# Patient Record
Sex: MALE | Race: ASIAN | NOT HISPANIC OR LATINO
[De-identification: names, ages, dates, MRNs, and addresses within clinical notes are randomized per-mention and may not be internally consistent; named-entity substitution may affect disease eponyms.]

---
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
## 2025-04-01 ENCOUNTER — TELEPHONE (OUTPATIENT)
Dept: FAMILY MEDICINE | Facility: CLINIC | Age: 25
End: 2025-04-01

## 2025-04-01 NOTE — TELEPHONE ENCOUNTER
Reason for Call:  Appointment Request    Patient requesting this type of appt:  patient called and would like an appt at [facility]     Reason: est new care; circumsion consult and procedure scheduling.    Please contact patient.  Thank you.    Requested provider: Kathleen Awad    Reason patient unable to be scheduled: Needs to be scheduled by clinic    When does patient want to be seen/preferred time:  asap    Comments: na    Okay to leave a detailed message?: Yes at Cell number on file:    Telephone Information:   Mobile [phone]       Call taken on 4/1/2025 at 1:51 PM by Nicole Perez

## 2025-04-01 NOTE — TELEPHONE ENCOUNTER
Spoke with patient and scheduled PHY with Dr Chad Najera Monday 04/14/25 needs referral for circumcision.

## 2025-04-14 ENCOUNTER — OFFICE VISIT (OUTPATIENT)
Dept: FAMILY MEDICINE | Facility: CLINIC | Age: 25
End: 2025-04-14
Payer: COMMERCIAL

## 2025-04-14 VITALS
SYSTOLIC BLOOD PRESSURE: 142 MMHG | RESPIRATION RATE: 20 BRPM | OXYGEN SATURATION: 95 % | TEMPERATURE: 98.6 F | WEIGHT: 140.5 LBS | DIASTOLIC BLOOD PRESSURE: 89 MMHG | HEIGHT: 61 IN | BODY MASS INDEX: 26.53 KG/M2 | HEART RATE: 97 BPM

## 2025-04-14 DIAGNOSIS — Z41.2 ENCOUNTER FOR CIRCUMCISION: ICD-10-CM

## 2025-04-14 DIAGNOSIS — Z00.00 ROUTINE GENERAL MEDICAL EXAMINATION AT A HEALTH CARE FACILITY: Primary | ICD-10-CM

## 2025-04-14 PROCEDURE — 99385 PREV VISIT NEW AGE 18-39: CPT | Performed by: FAMILY MEDICINE

## 2025-04-14 SDOH — HEALTH STABILITY: PHYSICAL HEALTH: ON AVERAGE, HOW MANY DAYS PER WEEK DO YOU ENGAGE IN MODERATE TO STRENUOUS EXERCISE (LIKE A BRISK WALK)?: 3 DAYS

## 2025-04-14 SDOH — HEALTH STABILITY: PHYSICAL HEALTH: ON AVERAGE, HOW MANY MINUTES DO YOU ENGAGE IN EXERCISE AT THIS LEVEL?: 30 MIN

## 2025-04-14 ASSESSMENT — SOCIAL DETERMINANTS OF HEALTH (SDOH): HOW OFTEN DO YOU GET TOGETHER WITH FRIENDS OR RELATIVES?: ONCE A WEEK

## 2025-04-14 NOTE — PROGRESS NOTES
"Preventive Care Visit  [facility]  Chad Najera MD, Family Medicine  Apr 14, 2025      Assessment & Plan     Routine general medical examination at a health care facility  - Comprehensive metabolic panel (BMP + Alb, Alk Phos, ALT, AST, Total. Bili, TP); Future  - CBC with platelets; Future  - Lipid panel reflex to direct LDL Fasting; Future    Encounter for circumcision  - Adult Urology  Referral; Future    Patient has been advised of split billing requirements and indicates understanding: Yes        BMI  Estimated body mass index is 26.77 kg/m  as calculated from the following:    Height as of this encounter: 1.543 m (5' 0.75\").    Weight as of this encounter: 63.7 kg (140 lb 8 oz).   Weight management plan: Discussed healthy diet and exercise guidelines    Counseling  Appropriate preventive services were addressed with this patient via screening, questionnaire, or discussion as appropriate for fall prevention, nutrition, physical activity, Tobacco-use cessation, social engagement, weight loss and cognition.  Checklist reviewing preventive services available has been given to the patient.  Reviewed patient's diet, addressing concerns and/or questions.   He is at risk for lack of exercise and has been provided with information to increase physical activity for the benefit of his well-being.   The patient was instructed to see the dentist every 6 months.       The longitudinal plan of care for the diagnosis(es)/condition(s) as documented were addressed during this visit. Due to the added complexity in care, I will continue to support Modesto in the subsequent management and with ongoing continuity of care.    Subjective   Modesto is a 24 year old, presenting for the following:  Physical (Wanting circ referral)        4/14/2025     7:53 AM   Additional Questions   Roomed by JILLIAN Singh          HPI  Requesting a referral to Urology for Circumcision.          Advance Care " Planning  Patient does not have a Health Care Directive: Discussed advance care planning with patient; however, patient declined at this time.      4/14/2025   General Health   How would you rate your overall physical health? Excellent   Feel stress (tense, anxious, or unable to sleep) Not at all         4/14/2025   Nutrition   Three or more servings of calcium each day? Yes   Diet: Regular (no restrictions)   How many servings of fruit and vegetables per day? (!) 2-3   How many sweetened beverages each day? 0-1         4/14/2025   Exercise   Days per week of moderate/strenous exercise 3 days   Average minutes spent exercising at this level 30 min         4/14/2025   Social Factors   Frequency of gathering with friends or relatives Once a week   Worry food won't last until get money to buy more No   Food not last or not have enough money for food? No   Do you have housing? (Housing is defined as stable permanent housing and does not include staying ouside in a car, in a tent, in an abandoned building, in an overnight shelter, or couch-surfing.) Patient declined   Are you worried about losing your housing? No   Lack of transportation? Patient declined   Unable to get utilities (heat,electricity)? No         4/14/2025   Dental   Dentist two times every year? (!) NO           Today's PHQ-2 Score:       4/14/2025     7:56 AM   PHQ-2 ( 1999 [employer])   Q1: Little interest or pleasure in doing things 0   Q2: Feeling down, depressed or hopeless 0   PHQ-2 Score 0           4/14/2025   Substance Use   Alcohol more than 3/day or more than 7/wk No   Do you use any other substances recreationally? No     Social History     Tobacco Use    Smoking status: Never    Smokeless tobacco: Never             4/14/2025   One time HIV Screening   Previous HIV test? No         4/14/2025   STI Screening   New sexual partner(s) since last STI/HIV test? (!) DECLINE         4/14/2025   Contraception/Family Planning   Questions about contraception  "or family planning No        Reviewed and updated as needed this visit by Provider                         Objective    Exam  BP (!) 142/89   Pulse 97   Temp 98.6  F (37  C) (Oral)   Resp 20   Ht 1.543 m (5' 0.75\")   Wt 63.7 kg (140 lb 8 oz)   SpO2 95%   BMI 26.77 kg/m     Estimated body mass index is 26.77 kg/m  as calculated from the following:    Height as of this encounter: 1.543 m (5' 0.75\").    Weight as of this encounter: 63.7 kg (140 lb 8 oz).    Physical Exam  GENERAL: alert and no distress  ENT: normal   NECK: no adenopathy, no asymmetry, masses, or scars  RESP: lungs clear to auscultation - no rales, rhonchi or wheezes  CV: regular rate and rhythm, normal S1 S2, no S3 or S4, no murmur, click or rub, no peripheral edema  ABDOMEN: soft, nontender, no hepatosplenomegaly, no masses and bowel sounds normal  MS: no gross musculoskeletal defects noted, no edema  SKIN: no suspicious lesions or rashes  NEURO: Normal strength and tone, mentation intact and speech normal  PSYCH: mentation appears normal, affect normal/bright        Signed Electronically by: Chad Najera MD    "

## 2025-04-14 NOTE — PATIENT INSTRUCTIONS
Patient Education   Preventive Care Advice   This is general advice given by our system to help you stay healthy. However, your care team may have specific advice just for you. Please talk to your care team about your preventive care needs.  Nutrition  Eat 5 or more servings of fruits and vegetables each day.  Try wheat bread, brown rice and whole grain pasta (instead of white bread, rice, and pasta).  Get enough calcium and vitamin D. Check the label on foods and aim for 100% of the RDA (recommended daily allowance).  Lifestyle  Exercise at least 150 minutes each week  (30 minutes a day, 5 days a week).  Do muscle strengthening activities 2 days a week. These help control your weight and prevent disease.  No smoking.  Wear sunscreen to prevent skin cancer.  Have a dental exam and cleaning every 6 months.  Yearly exams  See your health care team every year to talk about:  Any changes in your health.  Any medicines your care team has prescribed.  Preventive care, family planning, and ways to prevent chronic diseases.  Shots (vaccines)   HPV shots (up to age 26), if you've never had them before.  Hepatitis B shots (up to age 59), if you've never had them before.  COVID-19 shot: Get this shot when it's due.  Flu shot: Get a flu shot every year.  Tetanus shot: Get a tetanus shot every 10 years.  Pneumococcal, hepatitis A, and RSV shots: Ask your care team if you need these based on your risk.  Shingles shot (for age 50 and up)  General health tests  Diabetes screening:  Starting at age 35, Get screened for diabetes at least every 3 years.  If you are younger than age 35, ask your care team if you should be screened for diabetes.  Cholesterol test: At age 39, start having a cholesterol test every 5 years, or more often if advised.  Bone density scan (DEXA): At age 50, ask your care team if you should have this scan for osteoporosis (brittle bones).  Hepatitis C: Get tested at least once in your life.  STIs (sexually  transmitted infections)  Before age 24: Ask your care team if you should be screened for STIs.  After age 24: Get screened for STIs if you're at risk. You are at risk for STIs (including HIV) if:  You are sexually active with more than one person.  You don't use condoms every time.  You or a partner was diagnosed with a sexually transmitted infection.  If you are at risk for HIV, ask about PrEP medicine to prevent HIV.  Get tested for HIV at least once in your life, whether you are at risk for HIV or not.  Cancer screening tests  Cervical cancer screening: If you have a cervix, begin getting regular cervical cancer screening tests starting at age 21.  Breast cancer scan (mammogram): If you've ever had breasts, begin having regular mammograms starting at age 40. This is a scan to check for breast cancer.  Colon cancer screening: It is important to start screening for colon cancer at age 45.  Have a colonoscopy test every 10 years (or more often if you're at risk) Or, ask your provider about stool tests like a FIT test every year or Cologuard test every 3 years.  To learn more about your testing options, visit:   .  For help making a decision, visit:   https://bit.ly/ba81769.  Prostate cancer screening test: If you have a prostate, ask your care team if a prostate cancer screening test (PSA) at age 55 is right for you.  Lung cancer screening: If you are a current or former smoker ages 50 to 80, ask your care team if ongoing lung cancer screenings are right for you.  For informational purposes only. Not to replace the advice of your health care provider. Copyright   2023 [employer]. All rights reserved. Clinically reviewed by the [facility] Program. [employer] [id number] - REV 01/24.

## 2025-06-01 NOTE — PROGRESS NOTES
"UROLOGY CLINIC NEW VISIT    Chief Complaint:  Phimosis      Referring Provider:  Chad Najera    Subjective:     Modesto Rodriguez is a 25 year old male  with a PMHx significant for those items listed, who is presenting to clinic today to discuss phimosis.    Patient is unable to fully retract foreskin     Ramirez has no history of skin and local infections.   Has not had to take antibiotics/antifungals in the past     He is able to urinate, does not have ballooning. No hematuria     Otherwise healthy no history of DM, wound issues, blood thinners PCP notes reviewed.    Currently the patient has no fever, chills, nausea, vomiting or other signs/symptoms suggestive of acute systemic infection.    PMH  No past medical history on file.  PSH  No past surgical history on file.  FAMHx  No family history on file.  ALLERGY  No Known Allergies  MEDICATIONS  currently has no medications in their medication list.  ROS:  Constitutional: negative  Eyes: negative  Ears/Nose/Throat/Mouth: negative  Respiratory: negative  Cardiovascular: negative  Gastrointestinal: negative  Genito-Urinary: as documented above in HPI  Musculoskeletal: negative  Neurological: negative  Integumentary: negative      Objective:     BP (!) 140/100 (BP Location: Right arm, Patient Position: Sitting, Cuff Size: Adult Regular)   Pulse 80   Temp 96.8  F (36  C) (Temporal)    Physical Exam:  GENERAL: Patient is AOx3, in no acute distress  CARDIAC: regular rate  LUNG: breathing non labored  ABD: soft, nondistended  : normal uncircumcised phallus, testes descended bilaterally  No palpable peyronie's plaques  Tight phimotic ring unable to fully retract foreskin, minimal penoscrotal webbing      LABORATORY:  No results found for: \"CREATININE\"  No results found for: \"PSA\"      Assessment:       Modesto Rodriguez is a 25 year old male  with a PMHx significant for those items listed, who is presenting to clinic today to discuss phimosis.     Plan:      Encounter for " circumcision  Phimosis    We discussed that bothersome phimosis can result from, as well as predispose to, chronic irritation or inflammation of the prepuce. It can worsen or remain stable with time but rarely improves without intervention. Conservative treatment would include betamethasone valerate ointment 0.1% applied to the phimotic ring twice daily for at least 1 month and preferably two. The patient must pull back the redundant foreskin and apply a small amount of the ointment to the tightest portion of the ring for maximum possible effect.    If steroid ointment has not had the desired effect of treating the phimotic ring sufficiently to allow for retraction of the foreskin, then surgical treatment is reasonable. Surgery would include skin-sparing Heineke-Mikeliasz preputioplasty, versus a full circumcision with excision of redundant prepuce.    Discussed that risks of surgery on the prepuce and phallus include bleeding, infection, injury to urethra or nerves, poor cosmesis, chronic pain, formation of cicatrix (tethering scar) requiring intervention.     The patient verbalized understanding of the risks of surgery and wishes to proceed with circumcision, dorsal slit, possible scrotoplasty.

## 2025-06-03 ENCOUNTER — OFFICE VISIT (OUTPATIENT)
Dept: UROLOGY | Facility: CLINIC | Age: 25
End: 2025-06-03
Attending: FAMILY MEDICINE
Payer: COMMERCIAL

## 2025-06-03 VITALS — TEMPERATURE: 96.8 F | DIASTOLIC BLOOD PRESSURE: 100 MMHG | HEART RATE: 80 BPM | SYSTOLIC BLOOD PRESSURE: 140 MMHG

## 2025-06-03 DIAGNOSIS — Z41.2 ENCOUNTER FOR CIRCUMCISION: ICD-10-CM

## 2025-06-03 DIAGNOSIS — N47.1 PHIMOSIS: Primary | ICD-10-CM

## 2025-06-03 PROCEDURE — 99203 OFFICE O/P NEW LOW 30 MIN: CPT | Performed by: STUDENT IN AN ORGANIZED HEALTH CARE EDUCATION/TRAINING PROGRAM

## 2025-06-03 ASSESSMENT — PAIN SCALES - GENERAL: PAINLEVEL_OUTOF10: NO PAIN (0)

## 2025-06-04 ENCOUNTER — TELEPHONE (OUTPATIENT)
Dept: UROLOGY | Facility: CLINIC | Age: 25
End: 2025-06-04
Payer: COMMERCIAL

## 2025-06-04 ENCOUNTER — ANESTHESIA EVENT (OUTPATIENT)
Dept: SURGERY | Facility: AMBULATORY SURGERY CENTER | Age: 25
End: 2025-06-04
Payer: COMMERCIAL

## 2025-06-04 PROBLEM — Z41.2 ENCOUNTER FOR CIRCUMCISION: Status: ACTIVE | Noted: 2025-06-03

## 2025-06-04 RX ORDER — CEFAZOLIN SODIUM 2 G/100ML
2 INJECTION, SOLUTION INTRAVENOUS
Status: COMPLETED | OUTPATIENT
Start: 2025-06-04 | End: 2025-06-05

## 2025-06-04 NOTE — H&P
"Urology Preoperative H&P    Patient information was obtained from patient and medical chart review.  History/Exam limitations: none.    Modesto Rodriguez is a 25 year old male, here today to undergo Circumcision dorsal slit.    Denies significant changes to hx since last visit.    Patient Active Problem List    Diagnosis Date Noted    Encounter for circumcision 06/03/2025     Priority: Medium    Hearing Loss      Priority: Medium     Created by Conversion  Replacement Utility updated for latest IMO load        Limb Pain      Priority: Medium     Created by Conversion        Finger Sprain      Priority: Medium     Created by Conversion  [facility] Annotation: Nov 7 2013 12:29PM - Zechariah Franklin: small finger        Epistaxis      Priority: Medium     Created by Conversion         History reviewed. No pertinent past medical history.   History reviewed. No pertinent surgical history.  No Known Allergies   Social History     Tobacco Use    Smoking status: Never    Smokeless tobacco: Never   Substance Use Topics    Alcohol use: Not Currently      History reviewed. No pertinent family history.     Review of Systems  Consitutional: denies fevers, chills  Allergy: as indicated on chart   Respiratory: denies wheezing or shortness of breath   Cardiovascular: denies chest pain or palpitations  : denies changes in urinary symptoms since previous visit    Objective:     /88 (Cuff Size: Adult Regular)   Temp 98  F (36.7  C) (Temporal)   Resp 16   Ht 1.549 m (5' 1\")   Wt 63.5 kg (140 lb)   SpO2 95%   BMI 26.45 kg/m      General Appearance: NAD  Head/Neck: Normocephalic, atraumatic  Eyes: Anicteric, sclera white     Lungs/Chest: Respirations unlabored  Heart: Extremities warm and well-perfused  Abdomen: Soft, non-distended.   Extremities: Extremities without evidence of trauma  Skin:  No jaundice  Neurologic: GCS 15    Data Review: Labs    Reviewed    Images    Reviewed    Assessment:     Pre-Op Diagnosis Codes:      * " Encounter for circumcision [Z41.2]    Plan:     Urine culture not indicated     We discussed that bothersome phimosis can result from, as well as predispose to, chronic irritation or inflammation of the prepuce. It can worsen or remain stable with time but rarely improves without intervention. Conservative treatment would include betamethasone valerate ointment 0.1% applied to the phimotic ring twice daily for at least 1 month and preferably two. The patient must pull back the redundant foreskin and apply a small amount of the ointment to the tightest portion of the ring for maximum possible effect.    If steroid ointment has not had the desired effect of treating the phimotic ring sufficiently to allow for retraction of the foreskin, then surgical treatment is reasonable. Surgery would include skin-sparing Heineke-Mikulicz preputioplasty, versus a full circumcision with excision of redundant prepuce.    Discussed that risks of surgery on the prepuce and phallus include bleeding, infection, injury to urethra or nerves, poor cosmesis, chronic pain, formation of cicatrix (tethering scar) requiring intervention.     Procedure(s):  Circumcision possible dorsal keo Bach MD

## 2025-06-05 ENCOUNTER — ANESTHESIA (OUTPATIENT)
Dept: SURGERY | Facility: AMBULATORY SURGERY CENTER | Age: 25
End: 2025-06-05
Payer: COMMERCIAL

## 2025-06-05 ENCOUNTER — HOSPITAL ENCOUNTER (OUTPATIENT)
Facility: AMBULATORY SURGERY CENTER | Age: 25
End: 2025-06-05
Attending: STUDENT IN AN ORGANIZED HEALTH CARE EDUCATION/TRAINING PROGRAM
Payer: COMMERCIAL

## 2025-06-05 VITALS
HEIGHT: 61 IN | OXYGEN SATURATION: 94 % | DIASTOLIC BLOOD PRESSURE: 59 MMHG | WEIGHT: 140 LBS | HEART RATE: 112 BPM | BODY MASS INDEX: 26.43 KG/M2 | SYSTOLIC BLOOD PRESSURE: 112 MMHG | TEMPERATURE: 96.8 F | RESPIRATION RATE: 16 BRPM

## 2025-06-05 DIAGNOSIS — Z41.2 ENCOUNTER FOR CIRCUMCISION: Primary | ICD-10-CM

## 2025-06-05 RX ORDER — SODIUM CHLORIDE, SODIUM LACTATE, POTASSIUM CHLORIDE, CALCIUM CHLORIDE 600; 310; 30; 20 MG/100ML; MG/100ML; MG/100ML; MG/100ML
INJECTION, SOLUTION INTRAVENOUS CONTINUOUS
OUTPATIENT
Start: 2025-06-05

## 2025-06-05 RX ORDER — OXYCODONE HYDROCHLORIDE 10 MG/1
10 TABLET ORAL
OUTPATIENT
Start: 2025-06-05

## 2025-06-05 RX ORDER — DEXAMETHASONE SODIUM PHOSPHATE 10 MG/ML
INJECTION, SOLUTION INTRAMUSCULAR; INTRAVENOUS PRN
Status: DISCONTINUED | OUTPATIENT
Start: 2025-06-05 | End: 2025-06-05

## 2025-06-05 RX ORDER — NALOXONE HYDROCHLORIDE 0.4 MG/ML
0.1 INJECTION, SOLUTION INTRAMUSCULAR; INTRAVENOUS; SUBCUTANEOUS
OUTPATIENT
Start: 2025-06-05

## 2025-06-05 RX ORDER — ACETAMINOPHEN 325 MG/1
975 TABLET ORAL ONCE
Status: COMPLETED | OUTPATIENT
Start: 2025-06-05 | End: 2025-06-05

## 2025-06-05 RX ORDER — DEXAMETHASONE SODIUM PHOSPHATE 4 MG/ML
4 INJECTION, SOLUTION INTRA-ARTICULAR; INTRALESIONAL; INTRAMUSCULAR; INTRAVENOUS; SOFT TISSUE
OUTPATIENT
Start: 2025-06-05

## 2025-06-05 RX ORDER — ACETAMINOPHEN 650 MG
TABLET, EXTENDED RELEASE ORAL PRN
Status: DISCONTINUED | OUTPATIENT
Start: 2025-06-05 | End: 2025-06-05 | Stop reason: HOSPADM

## 2025-06-05 RX ORDER — ONDANSETRON 4 MG/1
4 TABLET, ORALLY DISINTEGRATING ORAL EVERY 30 MIN PRN
OUTPATIENT
Start: 2025-06-05

## 2025-06-05 RX ORDER — PROPOFOL 10 MG/ML
INJECTION, EMULSION INTRAVENOUS CONTINUOUS PRN
Status: DISCONTINUED | OUTPATIENT
Start: 2025-06-05 | End: 2025-06-05

## 2025-06-05 RX ORDER — SENNOSIDES 8.6 MG
650 CAPSULE ORAL EVERY 6 HOURS PRN
Qty: 30 TABLET | Refills: 0 | Status: SHIPPED | OUTPATIENT
Start: 2025-06-05

## 2025-06-05 RX ORDER — OXYCODONE HYDROCHLORIDE 5 MG/1
5 TABLET ORAL
OUTPATIENT
Start: 2025-06-05

## 2025-06-05 RX ORDER — PROPOFOL 10 MG/ML
INJECTION, EMULSION INTRAVENOUS PRN
Status: DISCONTINUED | OUTPATIENT
Start: 2025-06-05 | End: 2025-06-05

## 2025-06-05 RX ORDER — KETOROLAC TROMETHAMINE 10 MG/1
10 TABLET, FILM COATED ORAL EVERY 6 HOURS PRN
Qty: 28 TABLET | Refills: 0 | Status: SHIPPED | OUTPATIENT
Start: 2025-06-05 | End: 2025-06-12

## 2025-06-05 RX ORDER — ONDANSETRON 2 MG/ML
4 INJECTION INTRAMUSCULAR; INTRAVENOUS EVERY 30 MIN PRN
OUTPATIENT
Start: 2025-06-05

## 2025-06-05 RX ORDER — DIAZEPAM 10 MG/1
10 TABLET ORAL
Qty: 21 TABLET | Refills: 0 | Status: SHIPPED | OUTPATIENT
Start: 2025-06-05 | End: 2025-06-26

## 2025-06-05 RX ORDER — METHOCARBAMOL 750 MG/1
750 TABLET, FILM COATED ORAL 4 TIMES DAILY PRN
Qty: 56 TABLET | Refills: 0 | Status: SHIPPED | OUTPATIENT
Start: 2025-06-05 | End: 2025-06-19

## 2025-06-05 RX ORDER — FENTANYL CITRATE 0.05 MG/ML
50 INJECTION, SOLUTION INTRAMUSCULAR; INTRAVENOUS EVERY 5 MIN PRN
OUTPATIENT
Start: 2025-06-05

## 2025-06-05 RX ORDER — FENTANYL CITRATE 50 UG/ML
INJECTION, SOLUTION INTRAMUSCULAR; INTRAVENOUS PRN
Status: DISCONTINUED | OUTPATIENT
Start: 2025-06-05 | End: 2025-06-05

## 2025-06-05 RX ORDER — LIDOCAINE 40 MG/G
CREAM TOPICAL
OUTPATIENT
Start: 2025-06-05

## 2025-06-05 RX ORDER — BUPIVACAINE HYDROCHLORIDE 2.5 MG/ML
INJECTION, SOLUTION INFILTRATION; PERINEURAL PRN
Status: DISCONTINUED | OUTPATIENT
Start: 2025-06-05 | End: 2025-06-05 | Stop reason: HOSPADM

## 2025-06-05 RX ORDER — FENTANYL CITRATE 0.05 MG/ML
25 INJECTION, SOLUTION INTRAMUSCULAR; INTRAVENOUS EVERY 5 MIN PRN
OUTPATIENT
Start: 2025-06-05

## 2025-06-05 RX ORDER — ONDANSETRON 2 MG/ML
INJECTION INTRAMUSCULAR; INTRAVENOUS PRN
Status: DISCONTINUED | OUTPATIENT
Start: 2025-06-05 | End: 2025-06-05

## 2025-06-05 RX ORDER — GLYCOPYRROLATE 0.2 MG/ML
INJECTION, SOLUTION INTRAMUSCULAR; INTRAVENOUS PRN
Status: DISCONTINUED | OUTPATIENT
Start: 2025-06-05 | End: 2025-06-05

## 2025-06-05 RX ORDER — SODIUM CHLORIDE, SODIUM LACTATE, POTASSIUM CHLORIDE, CALCIUM CHLORIDE 600; 310; 30; 20 MG/100ML; MG/100ML; MG/100ML; MG/100ML
INJECTION, SOLUTION INTRAVENOUS CONTINUOUS PRN
Status: DISCONTINUED | OUTPATIENT
Start: 2025-06-05 | End: 2025-06-05

## 2025-06-05 RX ORDER — HYDROMORPHONE HCL IN WATER/PF 6 MG/30 ML
0.4 PATIENT CONTROLLED ANALGESIA SYRINGE INTRAVENOUS EVERY 5 MIN PRN
OUTPATIENT
Start: 2025-06-05

## 2025-06-05 RX ORDER — LIDOCAINE HYDROCHLORIDE 20 MG/ML
INJECTION, SOLUTION INFILTRATION; PERINEURAL PRN
Status: DISCONTINUED | OUTPATIENT
Start: 2025-06-05 | End: 2025-06-05

## 2025-06-05 RX ORDER — GINSENG 100 MG
CAPSULE ORAL PRN
Status: DISCONTINUED | OUTPATIENT
Start: 2025-06-05 | End: 2025-06-05 | Stop reason: HOSPADM

## 2025-06-05 RX ORDER — HYDROMORPHONE HCL IN WATER/PF 6 MG/30 ML
0.2 PATIENT CONTROLLED ANALGESIA SYRINGE INTRAVENOUS EVERY 5 MIN PRN
OUTPATIENT
Start: 2025-06-05

## 2025-06-05 RX ADMIN — FENTANYL CITRATE 100 MCG: 50 INJECTION, SOLUTION INTRAMUSCULAR; INTRAVENOUS at 10:42

## 2025-06-05 RX ADMIN — ACETAMINOPHEN 975 MG: 325 TABLET ORAL at 09:38

## 2025-06-05 RX ADMIN — LIDOCAINE HYDROCHLORIDE 3 ML: 20 INJECTION, SOLUTION INFILTRATION; PERINEURAL at 10:42

## 2025-06-05 RX ADMIN — PROPOFOL 180 MCG/KG/MIN: 10 INJECTION, EMULSION INTRAVENOUS at 10:43

## 2025-06-05 RX ADMIN — SODIUM CHLORIDE, SODIUM LACTATE, POTASSIUM CHLORIDE, CALCIUM CHLORIDE: 600; 310; 30; 20 INJECTION, SOLUTION INTRAVENOUS at 09:38

## 2025-06-05 RX ADMIN — GLYCOPYRROLATE 0.2 MG: 0.2 INJECTION, SOLUTION INTRAMUSCULAR; INTRAVENOUS at 10:46

## 2025-06-05 RX ADMIN — SODIUM CHLORIDE, SODIUM LACTATE, POTASSIUM CHLORIDE, CALCIUM CHLORIDE: 600; 310; 30; 20 INJECTION, SOLUTION INTRAVENOUS at 10:35

## 2025-06-05 RX ADMIN — PROPOFOL 200 MG: 10 INJECTION, EMULSION INTRAVENOUS at 10:42

## 2025-06-05 RX ADMIN — ONDANSETRON 4 MG: 2 INJECTION INTRAMUSCULAR; INTRAVENOUS at 11:22

## 2025-06-05 RX ADMIN — CEFAZOLIN SODIUM 2 G: 2 INJECTION, SOLUTION INTRAVENOUS at 10:35

## 2025-06-05 RX ADMIN — DEXAMETHASONE SODIUM PHOSPHATE 10 MG: 10 INJECTION, SOLUTION INTRAMUSCULAR; INTRAVENOUS at 10:46

## 2025-06-05 NOTE — ANESTHESIA POSTPROCEDURE EVALUATION
Patient: Modesto Rodriguez    Procedure: Procedure(s):  Circumcision with dorsal slit       Anesthesia Type:  General    Note:  Disposition: Outpatient   Postop Pain Control: Uneventful            Sign Out: Well controlled pain   PONV: No   Neuro/Psych: Uneventful            Sign Out: Acceptable/Baseline neuro status   Airway/Respiratory: Uneventful            Sign Out: Acceptable/Baseline resp. status   CV/Hemodynamics: Uneventful            Sign Out: Acceptable CV status; No obvious hypovolemia; No obvious fluid overload   Other NRE: NONE   DID A NON-ROUTINE EVENT OCCUR? No       Last vitals:  Vitals Value Taken Time   /61 06/05/25 12:31   Temp 96.9  F (36.1  C) 06/05/25 11:52   Pulse 107 06/05/25 12:39   Resp 16 06/05/25 12:31   SpO2 95 % 06/05/25 12:39   Vitals shown include unfiled device data.    Electronically Signed By: Jose Mcfarlane MD  June 5, 2025  12:44 PM

## 2025-06-05 NOTE — OP NOTE
Circumcision    PREOPERATIVE DIAGNOSIS : Phimosis     POSTOPERATIVE DIAGNOSIS: Same    Procedure:   Circumcision   Dorsal Slit      ATTENDING SURGEON(S) : Sergio Bach MD     RESIDENT SURGEON(S):  None     ANESTHESIA: General     PREPARATION: Chloraprep    EBL: Nonez    COMPLICATIONS: None    Indications: Phimosis pain and unable to retract foreskin.     PROCEDURE:  With the patient supine on the surgical table, following induction of adequate general anesthesia, genitalia were prepped and draped in usual sterile fashion.  A surgical timeout was completed.    The patient had significant pinpoint phimosis of the distal foreskin.  A dorsal slit was performed, a hemostat was used to compress the dorsal foreskin tissue and crush blood supply, the foreskin was then opened with a Metzenbaum scissors. A 7 x 7 mm preputial skirt was designed. The circumferential incision was completed and then the proximal  incision was completed circumferentially.    Hemostasis was achieved with electrocautery. The penile skin was now worked around ventrally so as to mould to the shaft.  A 3 -0 Monocryl suture was placed in simple fashion at the 12, 6,  3 and 9 o'clock positions approximating the  shaft skin to the preputial skirt at these locations including dartos with any remaining gaps filled in with 3-0 Monocryl sutures.     A Bacitracin was placed and then a  circumferential compression wrap of Evelin.  A dorsal penile and penile ring block using 0.25% marcaine palin was now perfomed at the base of the penis.    Plan  - Follow-up with Dr. Bach in 6 weeks

## 2025-06-05 NOTE — ANESTHESIA PREPROCEDURE EVALUATION
"Anesthesia Pre-Procedure Evaluation    Patient: Modesto Rodriguez   MRN: [id number] : 2000          Procedure : Procedure(s):  Circumcision possible dorsal slit         History reviewed. No pertinent past medical history.   History reviewed. No pertinent surgical history.   No Known Allergies   Social History     Tobacco Use    Smoking status: Never    Smokeless tobacco: Never   Substance Use Topics    Alcohol use: Not Currently      Wt Readings from Last 1 Encounters:   25 63.5 kg (140 lb)        Anesthesia Evaluation   Pt has had prior anesthetic.     No history of anesthetic complications       ROS/MED HX  ENT/Pulmonary:  - neg pulmonary ROS     Neurologic:  - neg neurologic ROS     Cardiovascular:  - neg cardiovascular ROS     METS/Exercise Tolerance: >4 METS    Hematologic:  - neg hematologic  ROS     Musculoskeletal:  - neg musculoskeletal ROS     GI/Hepatic:  - neg GI/hepatic ROS     Renal/Genitourinary:  - neg Renal ROS     Endo:  - neg endo ROS     Psychiatric/Substance Use:       Infectious Disease:       Malignancy:       Other:              Physical Exam  Airway  Mallampati: I  TM distance: >3 FB  Neck ROM: full    Cardiovascular - normal exam  Rhythm: regular  Rate: normal rate     Dental Comments: good      Pulmonary - normal examBreath sounds clear to auscultation        Neurological - normal exam  He appears awake, alert and oriented x3.    Other Findings       OUTSIDE LABS:  CBC: No results found for: \"WBC\", \"HGB\", \"HCT\", \"PLT\"  BMP: No results found for: \"NA\", \"POTASSIUM\", \"CHLORIDE\", \"CO2\", \"BUN\", \"CR\", \"GLC\"  COAGS: No results found for: \"PTT\", \"INR\", \"FIBR\"  POC: No results found for: \"BGM\", \"HCG\", \"HCGS\"  HEPATIC: No results found for: \"ALBUMIN\", \"PROTTOTAL\", \"ALT\", \"AST\", \"GGT\", \"ALKPHOS\", \"BILITOTAL\", \"BILIDIRECT\", \"JEM\"  OTHER: No results found for: \"PH\", \"LACT\", \"A1C\", \"CAITLIN\", \"PHOS\", \"MAG\", \"LIPASE\", \"AMYLASE\", \"TSH\", \"T4\", \"T3\", \"CRP\", \"SED\"    Anesthesia Plan    ASA Status:  " "1      NPO Status: NPO Appropriate   Anesthesia Type: General.  Airway: supraglottic airway.  Induction: intravenous.  Maintenance: TIVA.   Techniques and Equipment:     - Airway:  Planned airway equipment includes supraglottic airway.     - Monitoring Plan: standard ASA monitoring     Consents    Anesthesia Plan(s) and associated risks, benefits, and realistic alternatives discussed. Questions answered and patient/representative(s) expressed understanding.     - Discussed:     - Discussed with:  Patient        - Pt is DNR/DNI Status: no DNR          Postoperative Care         Comments:    Other Comments: Acetaminophen  Local by surgeon                   Jose Mcfarlane MD    I have reviewed the pertinent notes and labs in the chart from the past 30 days and (re)examined the patient.  Any updates or changes from those notes are reflected in this note.    Clinically Significant Risk Factors Present on Admission                             # Overweight: Estimated body mass index is 26.45 kg/m  as calculated from the following:    Height as of this encounter: 1.549 m (5' 1\").    Weight as of this encounter: 63.5 kg (140 lb).                    "

## 2025-06-05 NOTE — DISCHARGE INSTRUCTIONS
CIRCUMCISION  PATIENT  INSTRUCTIONS    You have just had a circumcision. There is a dressing around your penis. This dressing may fall off by itself, and that is ok. If it has not fallen off by (48 hours), then you can gently remove the dressing yourself.   The stitches will dissolve by themselves over time.    After you have removed the dressing, you may let warm soapy water from the shower run over the penis. Afterwards, pat dry gently, then apply bacitracin ointment to the area before putting on your underwear. Do not rub the area when you dry it off. You should apply bacitracin (antibiotic ointment) twice a day (morning and night).     It is normal to experience some minor oozing of blood around the incision line, penile swelling and mild  Pain.  This is normal    You may use ice packs for the swelling and discomfort. If you use an ice pack, apply the ice pack for 20 minutes at a time, with at least a 30 minute break in between.     For mild or moderate pain, take regular, over the counter Ibuprofen (Motrin) or Tylenol. Do not take more than 4 g of tylenol in a day. Do not take motrin if you have kidney disease or a history of peptic ulcer disease/GI bleed.     Restrictions:   - No heavy lifting over 15 pounds for the next 4 weeks   - No sexual activity for 4 weeks (it is ok to have an erection but do not use it)     If you have any problems relating to your procedure call your doctor's office nurse line at telephone  [facility] [place]  [facility], [address], [place] [address]  Phone: [phone]  Fax: [phone]    - Follow up in 6 weeks with Dr. Bach for post operative check, or sooner if issues arise      If you have any questions or concerns regarding your procedure please contact Dr. Bach, his office number is [phone].     You have received 975 mg of Acetaminophen (Tylenol) at 9:40 AM. Please do not take an additional dose of Tylenol until after  3:40 PM.     Do not exceed 4,000 mg of acetaminophen during a 24 hour period and keep in mind that acetaminophen can also be found in many over-the-counter cold medications as well as narcotics that may be given for pain.       [facility]   Adult Discharge Orders & Instructions     For 24 hours after surgery    Get plenty of rest.  A responsible adult must stay with you for at least 24 hours after you leave the hospital.   Do not drive or use heavy equipment.  If you have weakness or tingling, don't drive or use heavy equipment until this feeling goes away.  Do not drink alcohol.  Avoid strenuous or risky activities.  Ask for help when climbing stairs.   You may feel lightheaded.  IF so, sit for a few minutes before standing.  Have someone help you get up.   If you have nausea (feel sick to your stomach): Drink only clear liquids such as apple juice, ginger ale, broth or 7-Up.  Rest may also help.  Be sure to drink enough fluids.  Move to a regular diet as you feel able.  You may have a slight fever. Call the doctor if your fever is over 100 F (37.7 C) (taken under the tongue) or lasts longer than 24 hours.  You may have a dry mouth, a sore throat, muscle aches or trouble sleeping.  These should go away after 24 hours.  Do not make important or legal decisions.     Call your doctor for any of the followin.  Signs of infection (fever, growing tenderness at the surgery site, a large amount of drainage or bleeding, severe pain, foul-smelling drainage, redness, swelling).    2. It has been over 8 to 10 hours since surgery and you are still not able to urinate (pass water).    3.  Headache for over 24 hours.

## 2025-06-08 ENCOUNTER — ANESTHESIA (OUTPATIENT)
Dept: SURGERY | Facility: HOSPITAL | Age: 25
End: 2025-06-08
Payer: COMMERCIAL

## 2025-06-08 ENCOUNTER — ANESTHESIA EVENT (OUTPATIENT)
Dept: SURGERY | Facility: HOSPITAL | Age: 25
End: 2025-06-08
Payer: COMMERCIAL

## 2025-06-08 ENCOUNTER — HOSPITAL ENCOUNTER (EMERGENCY)
Facility: HOSPITAL | Age: 25
End: 2025-06-08
Attending: EMERGENCY MEDICINE
Payer: COMMERCIAL

## 2025-06-08 VITALS
OXYGEN SATURATION: 96 % | HEART RATE: 92 BPM | DIASTOLIC BLOOD PRESSURE: 82 MMHG | HEIGHT: 62 IN | SYSTOLIC BLOOD PRESSURE: 136 MMHG | WEIGHT: 140 LBS | RESPIRATION RATE: 16 BRPM | BODY MASS INDEX: 25.76 KG/M2 | TEMPERATURE: 98.2 F

## 2025-06-08 DIAGNOSIS — Z98.890 STATUS POST ROUTINE CIRCUMCISION: ICD-10-CM

## 2025-06-08 DIAGNOSIS — N99.820 POSTOPERATIVE HEMORRHAGE INVOLVING GENITOURINARY SYSTEM FOLLOWING GENITOURINARY PROCEDURE: Primary | ICD-10-CM

## 2025-06-08 LAB
ABO + RH BLD: NORMAL
ANION GAP SERPL CALCULATED.3IONS-SCNC: 13 MMOL/L (ref 7–15)
BASOPHILS # BLD AUTO: 0.1 10E3/UL (ref 0–0.2)
BASOPHILS NFR BLD AUTO: 1 %
BLD GP AB SCN SERPL QL: NEGATIVE
BUN SERPL-MCNC: 11 MG/DL (ref 6–20)
CALCIUM SERPL-MCNC: 9.1 MG/DL (ref 8.8–10.4)
CHLORIDE SERPL-SCNC: 105 MMOL/L (ref 98–107)
CREAT SERPL-MCNC: 1.13 MG/DL (ref 0.67–1.17)
EGFRCR SERPLBLD CKD-EPI 2021: >90 ML/MIN/1.73M2
EOSINOPHIL # BLD AUTO: 0.3 10E3/UL (ref 0–0.7)
EOSINOPHIL NFR BLD AUTO: 3 %
ERYTHROCYTE [DISTWIDTH] IN BLOOD BY AUTOMATED COUNT: 11.9 % (ref 10–15)
GLUCOSE SERPL-MCNC: 112 MG/DL (ref 70–99)
HCO3 SERPL-SCNC: 22 MMOL/L (ref 22–29)
HCT VFR BLD AUTO: 47.2 % (ref 40–53)
HGB BLD-MCNC: 16.2 G/DL (ref 13.3–17.7)
HOLD SPECIMEN: NORMAL
IMM GRANULOCYTES # BLD: 0 10E3/UL
IMM GRANULOCYTES NFR BLD: 0 %
LYMPHOCYTES # BLD AUTO: 2.3 10E3/UL (ref 0.8–5.3)
LYMPHOCYTES NFR BLD AUTO: 28 %
MCH RBC QN AUTO: 29.4 PG (ref 26.5–33)
MCHC RBC AUTO-ENTMCNC: 34.3 G/DL (ref 31.5–36.5)
MCV RBC AUTO: 86 FL (ref 78–100)
MONOCYTES # BLD AUTO: 0.5 10E3/UL (ref 0–1.3)
MONOCYTES NFR BLD AUTO: 6 %
NEUTROPHILS # BLD AUTO: 5.2 10E3/UL (ref 1.6–8.3)
NEUTROPHILS NFR BLD AUTO: 62 %
NRBC # BLD AUTO: 0 10E3/UL
NRBC BLD AUTO-RTO: 0 /100
PLATELET # BLD AUTO: 259 10E3/UL (ref 150–450)
POTASSIUM SERPL-SCNC: 3.9 MMOL/L (ref 3.4–5.3)
RBC # BLD AUTO: 5.51 10E6/UL (ref 4.4–5.9)
SODIUM SERPL-SCNC: 140 MMOL/L (ref 135–145)
SPECIMEN EXP DATE BLD: NORMAL
WBC # BLD AUTO: 8.3 10E3/UL (ref 4–11)

## 2025-06-08 PROCEDURE — 999N000141 HC STATISTIC PRE-PROCEDURE NURSING ASSESSMENT: Performed by: STUDENT IN AN ORGANIZED HEALTH CARE EDUCATION/TRAINING PROGRAM

## 2025-06-08 PROCEDURE — 250N000011 HC RX IP 250 OP 636: Performed by: PAIN MEDICINE

## 2025-06-08 PROCEDURE — 272N000001 HC OR GENERAL SUPPLY STERILE: Performed by: STUDENT IN AN ORGANIZED HEALTH CARE EDUCATION/TRAINING PROGRAM

## 2025-06-08 PROCEDURE — 250N000009 HC RX 250: Performed by: PAIN MEDICINE

## 2025-06-08 PROCEDURE — 360N000075 HC SURGERY LEVEL 2, PER MIN: Performed by: STUDENT IN AN ORGANIZED HEALTH CARE EDUCATION/TRAINING PROGRAM

## 2025-06-08 PROCEDURE — 710N000012 HC RECOVERY PHASE 2, PER MINUTE: Performed by: STUDENT IN AN ORGANIZED HEALTH CARE EDUCATION/TRAINING PROGRAM

## 2025-06-08 PROCEDURE — 370N000017 HC ANESTHESIA TECHNICAL FEE, PER MIN: Performed by: STUDENT IN AN ORGANIZED HEALTH CARE EDUCATION/TRAINING PROGRAM

## 2025-06-08 PROCEDURE — 250N000013 HC RX MED GY IP 250 OP 250 PS 637: Performed by: STUDENT IN AN ORGANIZED HEALTH CARE EDUCATION/TRAINING PROGRAM

## 2025-06-08 PROCEDURE — 250N000011 HC RX IP 250 OP 636: Performed by: STUDENT IN AN ORGANIZED HEALTH CARE EDUCATION/TRAINING PROGRAM

## 2025-06-08 PROCEDURE — 710N000009 HC RECOVERY PHASE 1, LEVEL 1, PER MIN: Performed by: STUDENT IN AN ORGANIZED HEALTH CARE EDUCATION/TRAINING PROGRAM

## 2025-06-08 PROCEDURE — 250N000009 HC RX 250: Performed by: STUDENT IN AN ORGANIZED HEALTH CARE EDUCATION/TRAINING PROGRAM

## 2025-06-08 PROCEDURE — 86901 BLOOD TYPING SEROLOGIC RH(D): CPT | Performed by: EMERGENCY MEDICINE

## 2025-06-08 PROCEDURE — 258N000003 HC RX IP 258 OP 636: Performed by: PAIN MEDICINE

## 2025-06-08 PROCEDURE — 54015 I&D PENIS DEEP: CPT | Mod: 78 | Performed by: STUDENT IN AN ORGANIZED HEALTH CARE EDUCATION/TRAINING PROGRAM

## 2025-06-08 PROCEDURE — 36415 COLL VENOUS BLD VENIPUNCTURE: CPT | Performed by: EMERGENCY MEDICINE

## 2025-06-08 PROCEDURE — 250N000025 HC SEVOFLURANE, PER MIN: Performed by: STUDENT IN AN ORGANIZED HEALTH CARE EDUCATION/TRAINING PROGRAM

## 2025-06-08 PROCEDURE — 250N000009 HC RX 250: Performed by: EMERGENCY MEDICINE

## 2025-06-08 PROCEDURE — 250N000011 HC RX IP 250 OP 636: Mod: JZ | Performed by: EMERGENCY MEDICINE

## 2025-06-08 PROCEDURE — 85004 AUTOMATED DIFF WBC COUNT: CPT | Performed by: EMERGENCY MEDICINE

## 2025-06-08 PROCEDURE — 99214 OFFICE O/P EST MOD 30 MIN: CPT | Mod: 25 | Performed by: STUDENT IN AN ORGANIZED HEALTH CARE EDUCATION/TRAINING PROGRAM

## 2025-06-08 PROCEDURE — 80048 BASIC METABOLIC PNL TOTAL CA: CPT | Performed by: EMERGENCY MEDICINE

## 2025-06-08 RX ORDER — FENTANYL CITRATE 50 UG/ML
25 INJECTION, SOLUTION INTRAMUSCULAR; INTRAVENOUS EVERY 5 MIN PRN
Status: DISCONTINUED | OUTPATIENT
Start: 2025-06-08 | End: 2025-06-08 | Stop reason: HOSPADM

## 2025-06-08 RX ORDER — SODIUM CHLORIDE, SODIUM LACTATE, POTASSIUM CHLORIDE, CALCIUM CHLORIDE 600; 310; 30; 20 MG/100ML; MG/100ML; MG/100ML; MG/100ML
INJECTION, SOLUTION INTRAVENOUS CONTINUOUS
Status: DISCONTINUED | OUTPATIENT
Start: 2025-06-08 | End: 2025-06-08 | Stop reason: HOSPADM

## 2025-06-08 RX ORDER — ACETAMINOPHEN 650 MG/1
650 SUPPOSITORY RECTAL ONCE
Status: COMPLETED | OUTPATIENT
Start: 2025-06-08 | End: 2025-06-08

## 2025-06-08 RX ORDER — DEXAMETHASONE SODIUM PHOSPHATE 10 MG/ML
INJECTION, SOLUTION INTRAMUSCULAR; INTRAVENOUS PRN
Status: DISCONTINUED | OUTPATIENT
Start: 2025-06-08 | End: 2025-06-08

## 2025-06-08 RX ORDER — ONDANSETRON 4 MG/1
4 TABLET, ORALLY DISINTEGRATING ORAL EVERY 30 MIN PRN
Status: ACTIVE | OUTPATIENT
Start: 2025-06-08

## 2025-06-08 RX ORDER — DEXAMETHASONE SODIUM PHOSPHATE 4 MG/ML
4 INJECTION, SOLUTION INTRA-ARTICULAR; INTRALESIONAL; INTRAMUSCULAR; INTRAVENOUS; SOFT TISSUE
Status: ACTIVE | OUTPATIENT
Start: 2025-06-08

## 2025-06-08 RX ORDER — ONDANSETRON 4 MG/1
4 TABLET, ORALLY DISINTEGRATING ORAL EVERY 30 MIN PRN
Status: DISCONTINUED | OUTPATIENT
Start: 2025-06-08 | End: 2025-06-08 | Stop reason: HOSPADM

## 2025-06-08 RX ORDER — ONDANSETRON 2 MG/ML
INJECTION INTRAMUSCULAR; INTRAVENOUS PRN
Status: DISCONTINUED | OUTPATIENT
Start: 2025-06-08 | End: 2025-06-08

## 2025-06-08 RX ORDER — ONDANSETRON 2 MG/ML
4 INJECTION INTRAMUSCULAR; INTRAVENOUS EVERY 30 MIN PRN
Status: DISCONTINUED | OUTPATIENT
Start: 2025-06-08 | End: 2025-06-08 | Stop reason: HOSPADM

## 2025-06-08 RX ORDER — OXYCODONE HYDROCHLORIDE 5 MG/1
10 TABLET ORAL
Status: ACTIVE | OUTPATIENT
Start: 2025-06-08

## 2025-06-08 RX ORDER — NALOXONE HYDROCHLORIDE 0.4 MG/ML
0.1 INJECTION, SOLUTION INTRAMUSCULAR; INTRAVENOUS; SUBCUTANEOUS
Status: ACTIVE | OUTPATIENT
Start: 2025-06-08

## 2025-06-08 RX ORDER — CEFAZOLIN SODIUM/WATER 2 G/20 ML
2 SYRINGE (ML) INTRAVENOUS SEE ADMIN INSTRUCTIONS
Status: DISCONTINUED | OUTPATIENT
Start: 2025-06-08 | End: 2025-06-08 | Stop reason: HOSPADM

## 2025-06-08 RX ORDER — PROPOFOL 10 MG/ML
INJECTION, EMULSION INTRAVENOUS PRN
Status: DISCONTINUED | OUTPATIENT
Start: 2025-06-08 | End: 2025-06-08

## 2025-06-08 RX ORDER — BUPIVACAINE HYDROCHLORIDE 2.5 MG/ML
INJECTION, SOLUTION INFILTRATION; PERINEURAL PRN
Status: DISCONTINUED | OUTPATIENT
Start: 2025-06-08 | End: 2025-06-08 | Stop reason: HOSPADM

## 2025-06-08 RX ORDER — NALOXONE HYDROCHLORIDE 0.4 MG/ML
INJECTION, SOLUTION INTRAMUSCULAR; INTRAVENOUS; SUBCUTANEOUS PRN
Status: DISCONTINUED | OUTPATIENT
Start: 2025-06-08 | End: 2025-06-08

## 2025-06-08 RX ORDER — TRANEXAMIC ACID 100 MG/ML
INJECTION, SOLUTION INTRAVENOUS ONCE
Status: COMPLETED | OUTPATIENT
Start: 2025-06-08 | End: 2025-06-08

## 2025-06-08 RX ORDER — ACETAMINOPHEN 325 MG/1
975 TABLET ORAL ONCE
Status: COMPLETED | OUTPATIENT
Start: 2025-06-08 | End: 2025-06-08

## 2025-06-08 RX ORDER — MAGNESIUM HYDROXIDE 1200 MG/15ML
LIQUID ORAL PRN
Status: DISCONTINUED | OUTPATIENT
Start: 2025-06-08 | End: 2025-06-08 | Stop reason: HOSPADM

## 2025-06-08 RX ORDER — DEXAMETHASONE SODIUM PHOSPHATE 4 MG/ML
4 INJECTION, SOLUTION INTRA-ARTICULAR; INTRALESIONAL; INTRAMUSCULAR; INTRAVENOUS; SOFT TISSUE
Status: DISCONTINUED | OUTPATIENT
Start: 2025-06-08 | End: 2025-06-08 | Stop reason: HOSPADM

## 2025-06-08 RX ORDER — OXYCODONE HYDROCHLORIDE 5 MG/1
5 TABLET ORAL
Status: ACTIVE | OUTPATIENT
Start: 2025-06-08

## 2025-06-08 RX ORDER — NALOXONE HYDROCHLORIDE 1 MG/ML
0.1 INJECTION INTRAMUSCULAR; INTRAVENOUS; SUBCUTANEOUS
Status: DISCONTINUED | OUTPATIENT
Start: 2025-06-08 | End: 2025-06-08 | Stop reason: HOSPADM

## 2025-06-08 RX ORDER — HYDROMORPHONE HYDROCHLORIDE 1 MG/ML
0.5 INJECTION, SOLUTION INTRAMUSCULAR; INTRAVENOUS; SUBCUTANEOUS ONCE
Refills: 0 | Status: COMPLETED | OUTPATIENT
Start: 2025-06-08 | End: 2025-06-08

## 2025-06-08 RX ORDER — LIDOCAINE HYDROCHLORIDE 10 MG/ML
INJECTION, SOLUTION INFILTRATION; PERINEURAL PRN
Status: DISCONTINUED | OUTPATIENT
Start: 2025-06-08 | End: 2025-06-08

## 2025-06-08 RX ORDER — AMOXICILLIN AND CLAVULANATE POTASSIUM 500; 125 MG/1; MG/1
1 TABLET, FILM COATED ORAL 2 TIMES DAILY
Qty: 10 TABLET | Refills: 0 | Status: SHIPPED | OUTPATIENT
Start: 2025-06-08 | End: 2025-06-13

## 2025-06-08 RX ORDER — OXYCODONE HYDROCHLORIDE 5 MG/1
5-10 TABLET ORAL EVERY 4 HOURS PRN
Qty: 6 TABLET | Refills: 0 | Status: SHIPPED | OUTPATIENT
Start: 2025-06-08

## 2025-06-08 RX ORDER — FENTANYL CITRATE 50 UG/ML
INJECTION, SOLUTION INTRAMUSCULAR; INTRAVENOUS PRN
Status: DISCONTINUED | OUTPATIENT
Start: 2025-06-08 | End: 2025-06-08

## 2025-06-08 RX ORDER — FENTANYL CITRATE 50 UG/ML
50 INJECTION, SOLUTION INTRAMUSCULAR; INTRAVENOUS EVERY 5 MIN PRN
Status: DISCONTINUED | OUTPATIENT
Start: 2025-06-08 | End: 2025-06-08 | Stop reason: HOSPADM

## 2025-06-08 RX ORDER — LIDOCAINE 40 MG/G
CREAM TOPICAL
Status: DISCONTINUED | OUTPATIENT
Start: 2025-06-08 | End: 2025-06-08 | Stop reason: HOSPADM

## 2025-06-08 RX ORDER — ONDANSETRON 4 MG/1
4 TABLET, ORALLY DISINTEGRATING ORAL EVERY 8 HOURS PRN
Qty: 4 TABLET | Refills: 0 | Status: SHIPPED | OUTPATIENT
Start: 2025-06-08

## 2025-06-08 RX ORDER — ONDANSETRON 2 MG/ML
4 INJECTION INTRAMUSCULAR; INTRAVENOUS EVERY 30 MIN PRN
Status: ACTIVE | OUTPATIENT
Start: 2025-06-08

## 2025-06-08 RX ORDER — CEFAZOLIN SODIUM/WATER 2 G/20 ML
2 SYRINGE (ML) INTRAVENOUS
Status: DISCONTINUED | OUTPATIENT
Start: 2025-06-08 | End: 2025-06-08 | Stop reason: HOSPADM

## 2025-06-08 RX ADMIN — PROPOFOL 200 MG: 10 INJECTION, EMULSION INTRAVENOUS at 21:31

## 2025-06-08 RX ADMIN — FENTANYL CITRATE 100 MCG: 50 INJECTION, SOLUTION INTRAMUSCULAR; INTRAVENOUS at 21:30

## 2025-06-08 RX ADMIN — MIDAZOLAM HYDROCHLORIDE 2 MG: 1 INJECTION, SOLUTION INTRAMUSCULAR; INTRAVENOUS at 21:24

## 2025-06-08 RX ADMIN — TRANEXAMIC ACID 1000 MG: 100 INJECTION INTRAVENOUS at 18:53

## 2025-06-08 RX ADMIN — Medication 100 MG: at 21:31

## 2025-06-08 RX ADMIN — LIDOCAINE HYDROCHLORIDE 5 ML: 10 INJECTION, SOLUTION INFILTRATION; PERINEURAL at 21:30

## 2025-06-08 RX ADMIN — DEXAMETHASONE SODIUM PHOSPHATE 10 MG: 10 INJECTION, SOLUTION INTRAMUSCULAR; INTRAVENOUS at 21:35

## 2025-06-08 RX ADMIN — NALOXONE HYDROCHLORIDE 0.04 MG: 0.4 INJECTION, SOLUTION INTRAMUSCULAR; INTRAVENOUS; SUBCUTANEOUS at 22:26

## 2025-06-08 RX ADMIN — ONDANSETRON 4 MG: 2 INJECTION INTRAMUSCULAR; INTRAVENOUS at 22:07

## 2025-06-08 RX ADMIN — HYDROMORPHONE HYDROCHLORIDE 0.5 MG: 1 INJECTION, SOLUTION INTRAMUSCULAR; INTRAVENOUS; SUBCUTANEOUS at 20:32

## 2025-06-08 RX ADMIN — ACETAMINOPHEN 975 MG: 325 TABLET ORAL at 21:11

## 2025-06-08 RX ADMIN — CEFAZOLIN 2 G: 1 INJECTION, POWDER, FOR SOLUTION INTRAMUSCULAR; INTRAVENOUS at 21:34

## 2025-06-08 RX ADMIN — NALOXONE HYDROCHLORIDE 0.04 MG: 0.4 INJECTION, SOLUTION INTRAMUSCULAR; INTRAVENOUS; SUBCUTANEOUS at 22:24

## 2025-06-08 RX ADMIN — FENTANYL CITRATE 100 MCG: 50 INJECTION, SOLUTION INTRAMUSCULAR; INTRAVENOUS at 21:42

## 2025-06-08 RX ADMIN — SODIUM CHLORIDE, SODIUM LACTATE, POTASSIUM CHLORIDE, AND CALCIUM CHLORIDE: .6; .31; .03; .02 INJECTION, SOLUTION INTRAVENOUS at 21:04

## 2025-06-08 ASSESSMENT — COLUMBIA-SUICIDE SEVERITY RATING SCALE - C-SSRS
1. IN THE PAST MONTH, HAVE YOU WISHED YOU WERE DEAD OR WISHED YOU COULD GO TO SLEEP AND NOT WAKE UP?: NO
2. HAVE YOU ACTUALLY HAD ANY THOUGHTS OF KILLING YOURSELF IN THE PAST MONTH?: NO
6. HAVE YOU EVER DONE ANYTHING, STARTED TO DO ANYTHING, OR PREPARED TO DO ANYTHING TO END YOUR LIFE?: NO

## 2025-06-08 ASSESSMENT — ACTIVITIES OF DAILY LIVING (ADL)
ADLS_ACUITY_SCORE: 41
ADLS_ACUITY_SCORE: 42
ADLS_ACUITY_SCORE: 41
ADLS_ACUITY_SCORE: 42
ADLS_ACUITY_SCORE: 42

## 2025-06-08 NOTE — ED PROVIDER NOTES
Emergency Department Encounter     Evaluation Date & Time:   6/8/2025  6:27 PM    CHIEF COMPLAINT:  Post-op Problem      Triage Note:       ED COURSE & MEDICAL DECISION MAKING:     Pt here for circumcision complication.  Pt had circumcision with dorsal slit for phimosis on Thursday, doing well until 45 mintues pta when he was urinating, felt like stitch came loose and started bleeding heavily from penis.  Pt not anticoagulated, no other medical history.  No known trauma to area.  Pt arrives soaked in blood in underwear/pants and standing in room bleeding.  Unclear exactly where bleeding coming from.    ED Course as of 06/08/25 2304   Sun Jun 08, 2025   1833 I met with the patient to obtain patient history and performed a physical exam. Discussed plan for ED work up including potential diagnostic studies and interventions.   1845 Spoke with Dr. Breaux, [facility].  Recommends trying silver nitrate or placing chromic gut suture if able.  Will come in if unable to control.   1850 Attempted silver nitrate cautery and TXA soaked gauze without much relief, unclear where bleeding coming from.   1910 Able to identify area of bleeding with likely dehisched portion around 5 o'clock position.  Placed single suture and bleeding seems to be controlled.     1915 I spoke again with Dr. Breaux, he will come in to evaluate pt as well.  I was able to place a single chromic gut suture and bleeding seems to be controlled currently.       1933 Pt re-examined and still oozing around sutures with worsening hematoma under skin, concerning for arterial or other vessel bleed.  No other obvious dehiscence.  Urologist, Dr. Breaux, is coming in after repeat discussion.   2005 Dr. Breaux with urology is here now.  Confirms he has arterial bleeding that's causing hematoma under skin. Will take to OR.         Medical Decision Making      Admit.    MIPS (CTPE, Dental pain, Rodriguez, Sinusitis, Asthma/COPD, Head Trauma): Not Applicable    SEPSIS:  None          MEDICATIONS GIVEN IN THE EMERGENCY DEPARTMENT:  Medications   lactated ringers infusion (has no administration in time range)   fentaNYL (PF) (SUBLIMAZE) injection 25 mcg (has no administration in time range)   fentaNYL (PF) (SUBLIMAZE) injection 50 mcg (has no administration in time range)   HYDROmorphone (DILAUDID) injection 0.2 mg (has no administration in time range)   HYDROmorphone (DILAUDID) injection 0.4 mg (has no administration in time range)   ondansetron (ZOFRAN ODT) ODT tab 4 mg (has no administration in time range)     Or   ondansetron (ZOFRAN) injection 4 mg (has no administration in time range)   dexAMETHasone (DECADRON) injection 4 mg (has no administration in time range)   prochlorperazine (COMPAZINE) injection 5 mg (has no administration in time range)   naloxone (NARCAN) injection 0.1 mg (has no administration in time range)   tranexamic acid (CYKLOKAPRON) TOPICAL (injection used topically) (1,000 mg Topical $Given by Other 6/8/25 1853)   HYDROmorphone (PF) (DILAUDID) injection 0.5 mg (0.5 mg Intravenous $Given 6/8/25 2032)   acetaminophen (TYLENOL) tablet 975 mg (975 mg Oral $Given 6/8/25 2111)     Or   acetaminophen (TYLENOL) Suppository 650 mg ( Rectal See Alternative 6/8/25 2111)       NEW PRESCRIPTIONS STARTED AT TODAY'S ED VISIT:  Current Discharge Medication List        START taking these medications    Details   amoxicillin-clavulanate (AUGMENTIN) 500-125 MG tablet Take 1 tablet by mouth 2 times daily for 5 days.  Qty: 10 tablet, Refills: 0    Associated Diagnoses: Postoperative hemorrhage involving genitourinary system following genitourinary procedure      ondansetron (ZOFRAN ODT) 4 MG ODT tab Take 1 tablet (4 mg) by mouth every 8 hours as needed for nausea.  Qty: 4 tablet, Refills: 0    Associated Diagnoses: Postoperative hemorrhage involving genitourinary system following genitourinary procedure      oxyCODONE (ROXICODONE) 5 MG tablet Take 1-2 tablets (5-10 mg) by mouth  "every 4 hours as needed for moderate to severe pain.  Qty: 6 tablet, Refills: 0    Associated Diagnoses: Postoperative hemorrhage involving genitourinary system following genitourinary procedure             HPI   HPI     Modesto Rodriguez is a 25 year old male with a pertinent history of circumcision (6/5/2025) who presents to this ED by private vehicle for evaluation of post-op problem.     Patient reports circumcision procedure 3 days ago, that has been healing well other than some swelling. 45 minutes PTA he was going to the bathroom and felt a sudden onset \"pinch of pain\" and looked down to see some stiches undone and lots of bleeding. States it was unexpected and he has not done anything to irritate or rip open the area. Not on blood thinners.     Per chart review:  - Patient seen at [facility] on 6/5/2025 for circumcision with dorsal slit due to phimosis pain and unable to retract foreskin. Procedure done by Dr. Bach. No complications. Follow-up with Dr. Bach in 6 weeks.    REVIEW OF SYSTEMS:  See HPI      Medical History   History reviewed. No pertinent past medical history.    Past Surgical History:   Procedure Laterality Date    APPENDECTOMY      CIRCUMCISION N/A 06/05/2025    Procedure: Circumcision with dorsal slit;  Surgeon: Sergio Bach MD;  Location: [facility] OR       History reviewed. No pertinent family history.    Social History     Tobacco Use    Smoking status: Never    Smokeless tobacco: Never   Substance Use Topics    Alcohol use: Not Currently    Drug use: Not Currently       amoxicillin-clavulanate (AUGMENTIN) 500-125 MG tablet  ondansetron (ZOFRAN ODT) 4 MG ODT tab  oxyCODONE (ROXICODONE) 5 MG tablet        Physical Exam     Vitals:  BP (!) 142/83   Pulse 92   Temp 97.4  F (36.3  C) (Temporal)   Resp 20   Ht 1.575 m (5' 2\")   Wt 63.5 kg (140 lb)   SpO2 96%   BMI 25.61 kg/m      PHYSICAL EXAM:   Physical Exam  Vitals and nursing note reviewed. "   Constitutional:       General: He is not in acute distress.     Appearance: Normal appearance.   HENT:      Head: Normocephalic and atraumatic.      Nose: Nose normal.      Mouth/Throat:      Mouth: Mucous membranes are moist.   Eyes:      Extraocular Movements: Extraocular movements intact.   Cardiovascular:      Rate and Rhythm: Normal rate and regular rhythm.      Pulses: Normal pulses.           Radial pulses are 2+ on the right side and 2+ on the left side.        Dorsalis pedis pulses are 2+ on the right side and 2+ on the left side.   Pulmonary:      Effort: Pulmonary effort is normal.   Genitourinary:     Penis: Circumcised.       Comments: Tiny area of dehiscence along 5 o'clock position with persistent bleeding around sutures  Skin:     Findings: No rash.   Neurological:      General: No focal deficit present.      Mental Status: He is alert. Mental status is at baseline.      Comments: Fluent speech   Psychiatric:         Mood and Affect: Mood normal.         Behavior: Behavior normal.         Results     LAB:  All pertinent labs reviewed and interpreted  Labs Ordered and Resulted from Time of ED Arrival to Time of ED Departure   BASIC METABOLIC PANEL - Abnormal       Result Value    Sodium 140      Potassium 3.9      Chloride 105      Carbon Dioxide (CO2) 22      Anion Gap 13      Urea Nitrogen 11.0      Creatinine 1.13      GFR Estimate >90      Calcium 9.1      Glucose 112 (*)    CBC WITH PLATELETS AND DIFFERENTIAL    WBC Count 8.3      RBC Count 5.51      Hemoglobin 16.2      Hematocrit 47.2      MCV 86      MCH 29.4      MCHC 34.3      RDW 11.9      Platelet Count 259      % Neutrophils 62      % Lymphocytes 28      % Monocytes 6      % Eosinophils 3      % Basophils 1      % Immature Granulocytes 0      NRBCs per 100 WBC 0      Absolute Neutrophils 5.2      Absolute Lymphocytes 2.3      Absolute Monocytes 0.5      Absolute Eosinophils 0.3      Absolute Basophils 0.1      Absolute Immature  Granulocytes 0.0      Absolute NRBCs 0.0     TYPE AND SCREEN, ADULT    ABO/RH(D) B POS      Antibody Screen Negative      SPECIMEN EXPIRATION DATE 6/11/2025 11:59:00 PM CDT     ABO/RH TYPE AND SCREEN       RADIOLOGY:  No orders to display                ECG:  none    PROCEDURES:  Procedures:  PROCEDURE: Laceration Repair   INDICATIONS: Laceration   PROCEDURE PROVIDER: Dr Mann Hector   SITE: Penis - bleeding, partially dehisced circumcision   TYPE/SIZE: superficial, complex, clean, and no foreign body visualized  0.5 cm (total length)   FUNCTIONAL ASSESSMENT: Distal sensation, circulation, and motor intact   MEDICATION: 1 mLs of 1% Lidocaine without epinephrine   PREPARATION: irrigation with Normal saline   DEBRIDEMENT: none   CLOSURE:  Single 3-0 chromic gut suture placed in simple interrupted fashion into area of dehiscence    Total number of sutures/staples placed: 1             FINAL IMPRESSION:    ICD-10-CM    1. Postoperative hemorrhage involving genitourinary system following genitourinary procedure  N99.820 oxyCODONE (ROXICODONE) 5 MG tablet     ondansetron (ZOFRAN ODT) 4 MG ODT tab     amoxicillin-clavulanate (AUGMENTIN) 500-125 MG tablet      2. Status post routine circumcision  Z98.890 Case Request: EXPLORATION, Penile with release of hematoma     Case Request: EXPLORATION, Penile with release of hematoma          0 minutes of critical care time      I, Jessica Arguelles, am serving as a scribe to document services personally performed by Dr. Ricardo Hector, based on my observations and the provider's statements to me. IRicardo, DO attest that Jessica Agruelles is acting in a scribe capacity, has observed my performance of the services and has documented them in accordance with my direction.      Ricardo Hector DO  Emergency Medicine  [facility] EMERGENCY DEPARTMENT  6/8/2025  6:33 PM          Ricardo Hector MD  06/08/25 4212

## 2025-06-08 NOTE — ED TRIAGE NOTES
"Pt arrives to department ambulatory from home with his brother.    Pt story: Pt arrives with blood running down both legs. Pt brought to a room and a provider called for. Provider at bedside during triage. Pt had a circumcision performed on Thursday. About 45 mins before arrival pt was going to the bathroom when he felt a pop and the bleeding started.     Related medications taken at home prior to arrival: Tylenol 650 mg at 1400.    BP (!) 142/97   Pulse 106   Resp 18   Ht 1.575 m (5' 2\")   Wt 63.5 kg (140 lb)   SpO2 99%   BMI 25.61 kg/m       Pt oriented to department, standard department flow, and updated on immediate plan of care. Questions answered.   "

## 2025-06-09 ENCOUNTER — TELEPHONE (OUTPATIENT)
Dept: UROLOGY | Facility: CLINIC | Age: 25
End: 2025-06-09
Payer: COMMERCIAL

## 2025-06-09 ASSESSMENT — ACTIVITIES OF DAILY LIVING (ADL)
ADLS_ACUITY_SCORE: 41

## 2025-06-09 NOTE — TELEPHONE ENCOUNTER
[facility] Call Center    Phone Message    May a detailed message be left on voicemail: yes     Reason for Call: Appointment Intake    Referring Provider Name: Saeid  Diagnosis and/or Symptoms: Postcircumcision penile hematoma. Per Dr Breaux patient is to be seen for reassessment with Dr Bach in the next couple of days. Please review and call him back to discuss.     Action Taken: Message routed to:  [facility]    Travel Screening: Not Applicable     Date of Service:

## 2025-06-09 NOTE — ANESTHESIA PREPROCEDURE EVALUATION
Anesthesia Pre-Procedure Evaluation    Patient: Modesto Rodriguez   MRN: [id number] : 2000          Procedure : Procedure(s):  EXPLORATION, Penile with release of hematoma         History reviewed. No pertinent past medical history.   Past Surgical History:   Procedure Laterality Date    CIRCUMCISION N/A 2025    Procedure: Circumcision with dorsal slit;  Surgeon: Sergio Bach MD;  Location: [facility] OR      No Known Allergies   Social History     Tobacco Use    Smoking status: Never    Smokeless tobacco: Never   Substance Use Topics    Alcohol use: Not Currently      Wt Readings from Last 1 Encounters:   25 63.5 kg (140 lb)        Anesthesia Evaluation   Pt has had prior anesthetic. Type: General.    No history of anesthetic complications       ROS/MED HX  ENT/Pulmonary:  - neg pulmonary ROS     Neurologic:  - neg neurologic ROS     Cardiovascular:  - neg cardiovascular ROS     METS/Exercise Tolerance: >4 METS    Hematologic:  - neg hematologic  ROS     Musculoskeletal:  - neg musculoskeletal ROS     GI/Hepatic:  - neg GI/hepatic ROS     Renal/Genitourinary:  - neg Renal ROS     Endo:  - neg endo ROS     Psychiatric/Substance Use:  - neg psychiatric ROS     Infectious Disease:  - neg infectious disease ROS     Malignancy:  - neg malignancy ROS     Other:  - neg other ROS            Physical Exam  Airway  Mallampati: I  TM distance: >3 FB  Neck ROM: full  Mouth opening: >= 4 cm    Cardiovascular - normal exam   Dental   (+) Modest Abnormalities - crowns, retainers, 1 or 2 missing teeth      Pulmonary - normal exam      Neurological - normal exam  He appears awake, alert and oriented x3.    Other Findings       OUTSIDE LABS:  CBC:   Lab Results   Component Value Date    WBC 8.3 2025    HGB 16.2 2025    HCT 47.2 2025     2025     BMP:   Lab Results   Component Value Date     2025    POTASSIUM 3.9 2025    CHLORIDE 105 2025    CO2 22  "06/08/2025    BUN 11.0 06/08/2025    CR 1.13 06/08/2025     (H) 06/08/2025     COAGS: No results found for: \"PTT\", \"INR\", \"FIBR\"  POC: No results found for: \"BGM\", \"HCG\", \"HCGS\"  HEPATIC: No results found for: \"ALBUMIN\", \"PROTTOTAL\", \"ALT\", \"AST\", \"GGT\", \"ALKPHOS\", \"BILITOTAL\", \"BILIDIRECT\", \"JEM\"  OTHER:   Lab Results   Component Value Date    CAITLIN 9.1 06/08/2025       Anesthesia Plan    ASA Status:  1, emergent      NPO Status: NPO Appropriate   Anesthesia Type: General.  Airway: oral.  Induction: intravenous.  Maintenance: Balanced.   Techniques and Equipment:       - Monitoring Plan: standard ASA monitoring     Consents    Anesthesia Plan(s) and associated risks, benefits, and realistic alternatives discussed. Questions answered and patient/representative(s) expressed understanding.     - Discussed: CRNA     - Discussed with:  Patient        - Pt is DNR/DNI Status: no DNR          Postoperative Care    Pain management: non-narcotic analgesics, plan for postoperative opioid use, multimodal analgesia.     Comments:                   Annalisa Floyd MD    I have reviewed the pertinent notes and labs in the chart from the past 30 days and (re)examined the patient.  Any updates or changes from those notes are reflected in this note.    Clinically Significant Risk Factors Present on Admission                             # Overweight: Estimated body mass index is 25.61 kg/m  as calculated from the following:    Height as of this encounter: 1.575 m (5' 2\").    Weight as of this encounter: 63.5 kg (140 lb).                    "

## 2025-06-09 NOTE — CONSULTS
Urology Consult    Name:  Modesto Rodriguez  MRN:  [id number]  Age/: 25 year old, 2000    CC: Postcircumcision penile hematoma    HPI: Modesto Rodriguez is a(n) 25 year old male who had circumcision with Dr. Bach on 2025.  He was doing well up until this evening when after going to the bathroom he felt the suture popped and started to bleed.  His penis started to increase in size and get engorged and you presented to the ER.  At the ER the ER physician did attempt to place a stitch which however was not successful.  Currently he is having significant penile pain and swelling though the bleeding has somewhat reduced  Denies any significant history of bleeding disorders    Past Medical History:  History reviewed. No pertinent past medical history.    Past Surgical History:  Past Surgical History:   Procedure Laterality Date    CIRCUMCISION N/A 2025    Procedure: Circumcision with dorsal slit;  Surgeon: Sergio Bach MD;  Location: [facility]       Allergies:   No Known Allergies    Medications:  No current facility-administered medications for this encounter.       Social History:  Social History     Socioeconomic History    Marital status: Single     Spouse name: Not on file    Number of children: Not on file    Years of education: Not on file    Highest education level: Not on file   Occupational History    Not on file   Tobacco Use    Smoking status: Never    Smokeless tobacco: Never   Substance and Sexual Activity    Alcohol use: Not Currently    Drug use: Not Currently    Sexual activity: Not on file   Other Topics Concern    Not on file   Social History Narrative    Not on file     Social Drivers of Health     Financial Resource Strain: Low Risk  (2025)    Financial Resource Strain     Within the past 12 months, have you or your family members you live with been unable to get utilities (heat, electricity) when it was really needed?: No   Food Insecurity: Low Risk  (2025)    Food  Insecurity     Within the past 12 months, did you worry that your food would run out before you got money to buy more?: No     Within the past 12 months, did the food you bought just not last and you didn t have money to get more?: No   Transportation Needs: Unknown (4/14/2025)    Transportation Needs     Within the past 12 months, has lack of transportation kept you from medical appointments, getting your medicines, non-medical meetings or appointments, work, or from getting things that you need?: Patient declined   Physical Activity: Insufficiently Active (4/14/2025)    Exercise Vital Sign     Days of Exercise per Week: 3 days     Minutes of Exercise per Session: 30 min   Stress: No Stress Concern Present (4/14/2025)    [place] [employer] - Occupational Stress Questionnaire     Feeling of Stress : Not at all   Social Connections: Unknown (4/14/2025)    Social Connection and Isolation Panel [NHANES]     Frequency of Communication with Friends and Family: Not on file     Frequency of Social Gatherings with Friends and Family: Once a week     Attends Jainism Services: Not on file     Active Member of Clubs or Organizations: Not on file     Attends Club or Organization Meetings: Not on file     Marital Status: Not on file   Interpersonal Safety: Low Risk  (4/14/2025)    Interpersonal Safety     Do you feel physically and emotionally safe where you currently live?: Yes     Within the past 12 months, have you been hit, slapped, kicked or otherwise physically hurt by someone?: No     Within the past 12 months, have you been humiliated or emotionally abused in other ways by your partner or ex-partner?: No   Housing Stability: Low Risk  (4/14/2025)    Housing Stability     Do you have housing? : Patient declined     Are you worried about losing your housing?: No       Family History:  History reviewed. No pertinent family history.    ROS:  The remainder of the complete ROS was negative unless noted  "in the HPI.    Exam:  BP (!) 136/97   Pulse 97   Temp 98.7  F (37.1  C) (Oral)   Resp 18   Ht 1.575 m (5' 2\")   Wt 63.5 kg (140 lb)   SpO2 96%   BMI 25.61 kg/m    General: Alert, interactive, & in pain  Resp: CTAB, no crackles or wheezes  Cardiac: Regular rate; extremities warm;   Abdomen: Soft, nontender, nondistended. .  : Edematous penis with significant sized hematoma.  Upon squeezing there is extrusion of blood from the circumcision site.  I reviewed stool of the sutures and reapplied compression dressing prior to transfer to the OR  Extremities: No LE edema or obvious joint abnormalities  Skin: Warm and dry, no jaundice or rash    Labs:  Lab Results   Component Value Date    WBC 8.3 06/08/2025    HGB 16.2 06/08/2025    HCT 47.2 06/08/2025     06/08/2025     06/08/2025    POTASSIUM 3.9 06/08/2025    CHLORIDE 105 06/08/2025    CO2 22 06/08/2025    BUN 11.0 06/08/2025    CR 1.13 06/08/2025     (H) 06/08/2025           Assessment and Plan: Modesto Rodriguez is a(n) 25 year old male with postcircumcision secondary hemorrhage and hematoma possibly related to suture giving away.  I discussed with him about the significance of this and the fact that further control of the bleeding requires exploration in the operating room as well as the suturing of the prior incision.  I also explained to him that we need to do this urgently within the next couple of hours.  I discussed with him that there is a slight risk of infection, however, we should be able to take care of the bleed without any significant issues.  Based on how he responds to the treatment he might need to stay the night, otherwise, I would expect him to be discharged but he will need to follow-up with Dr. Bach in the next couple of days for reassessment.  Based on the discussions we had he was agreeable to proceed  An informed consent was signed      Caleb Breaux MD  [facility]    "

## 2025-06-09 NOTE — ED NOTES
Report to PACU, Karen GODINEZ  
Normal rate, regular rhythm.  Heart sounds S1, S2.  No murmurs, rubs or gallops.

## 2025-06-09 NOTE — PROGRESS NOTES
UROLOGY CLINIC NEW VISIT    Chief Complaint:  Phimosis      Referring Provider:  Chad Najera    Subjective:     Modesto Rodriguez is a 25 year old male  with a PMHx significant for those items listed, who is presenting to clinic today to discuss phimosis.    Patient is unable to fully retract foreskin     Ramirez has no history of skin and local infections.   Has not had to take antibiotics/antifungals in the past     He is able to urinate, does not have ballooning. No hematuria     Otherwise healthy no history of DM, wound issues, blood thinners PCP notes reviewed.    6/10/2025  Was doing after surgery until POD 3 when voiding noted possible stitch popping, started to have bleeding which required a clot evac and exploration 6/8 with Dr. Breaux   Still has bandage on. Pain is around a 1-2 he is otherwise doing well able to void, otherwise recovering well from exploration    Path: BENIGN FORESKIN WITH DERMAL FIBROSIS     Currently the patient has no fever, chills, nausea, vomiting or other signs/symptoms suggestive of acute systemic infection.    PMH  No past medical history on file.  PSH  Past Surgical History:   Procedure Laterality Date    APPENDECTOMY      CIRCUMCISION N/A 06/05/2025    Procedure: Circumcision with dorsal slit;  Surgeon: Sergio Bach MD;  Location: [facility] OR    EXPLORE SCROTUM N/A 6/8/2025    Procedure: EXPLORATION, Penile with;  Surgeon: Caleb Breaux MD;  Location: [facility] [place] [place] OR    INCISION AND DRAINAGE ABSCESS SCROTUM, COMBINED  6/8/2025    Procedure: release of hematoma WITH REDO REPAIR;  Surgeon: Caleb Breaux MD;  Location: [facility] [place] [place] OR     FAMHx  No family history on file.  ALLERGY  No Known Allergies  MEDICATIONS  has a current medication list which includes the following prescription(s): acetaminophen, amoxicillin-clavulanate, diazepam, ketorolac, methocarbamol, ondansetron, and oxycodone, and the following Facility-Administered Medications: dexamethasone,  "naloxone, ondansetron **OR** ondansetron, oxycodone, oxycodone, prochlorperazine.  ROS:  Constitutional: negative  Eyes: negative  Ears/Nose/Throat/Mouth: negative  Respiratory: negative  Cardiovascular: negative  Gastrointestinal: negative  Genito-Urinary: as documented above in HPI  Musculoskeletal: negative  Neurological: negative  Integumentary: negative      Objective:     BP (!) 139/95   Pulse 80   Temp 97.1  F (36.2  C) (Tympanic)   Ht 1.549 m (5' 1\")   Wt 63.5 kg (140 lb)   SpO2 97%   BMI 26.45 kg/m     Physical Exam:  GENERAL: Patient is AOx3, in no acute distress  CARDIAC: regular rate  LUNG: breathing non labored  ABD: soft, nondistended  : Phallus with bruising and swelling around suture line. Skin is intact. There is small area on the left 3 o'clock position where there is slight swelling with some hematoma noted under the tissue, was able to express some old blood from between the sutures with mild resolution. Some expected scrotal bruising    LABORATORY:  No results found for: \"CREATININE\"  No results found for: \"PSA\"      Assessment:     Modesto Rodriguez is a 25 year old male  with a PMHx significant for those items listed, who is presenting to clinic today to discuss phimosis.     Plan:      Phimosis  Encounter for circumcision  S/p exploration 6/8 and re-anastomosis      - The incision is intact, was able to express some old blood from the suture line. He has mild discomfort but otherwise recovering well   - Continue 5 day antibiotic course to completion  - Wrap discontinued, discussed general cleaning and return precautions   - He will follow up next week for a wound check, encouraged to reach out with any concerns      "

## 2025-06-09 NOTE — ANESTHESIA POSTPROCEDURE EVALUATION
Patient: Modesto Rodriguez    Procedure: Procedure(s):  EXPLORATION, Penile with release of hematoma WITH REDO REPAIR       Anesthesia Type:  General    Note:  Disposition: Inpatient   Postop Pain Control: Uneventful            Sign Out: Well controlled pain   PONV: No   Neuro/Psych: Uneventful            Sign Out: Acceptable/Baseline neuro status   Airway/Respiratory: Uneventful            Sign Out: Acceptable/Baseline resp. status   CV/Hemodynamics: Uneventful            Sign Out: Acceptable CV status; No obvious hypovolemia; No obvious fluid overload   Other NRE: NONE   DID A NON-ROUTINE EVENT OCCUR? No           Last vitals:  Vitals Value Taken Time   /94 06/08/25 23:15   Temp 36.3  C (97.4  F) 06/08/25 22:30   Pulse 104 06/08/25 23:24   Resp 17 06/08/25 22:59   SpO2 97 % 06/08/25 23:24   Vitals shown include unfiled device data.    Electronically Signed By: Annalisa Floyd MD  June 8, 2025  11:25 PM

## 2025-06-09 NOTE — ANESTHESIA PROCEDURE NOTES
Airway       Patient location during procedure: OR       Procedure Start/Stop Times: 6/8/2025 9:33 PM  Staff -        CRNA: [place] APRN CRNA       Performed By: CRNAIndications and Patient Condition       Indications for airway management: edmundo-procedural       Induction type:intravenous       Mask difficulty assessment: 0 - not attempted    Final Airway Details       Final airway type: endotracheal airway       Successful airway: ETT - single  Endotracheal Airway Details        ETT size (mm): 7.0       Cuffed: yes       Successful intubation technique: direct laryngoscopy       DL Blade Type: Mach[place] 2       Grade View of Cords: 1       Adjucts: stylet       Position: Right       Measured from: lips       Secured at (cm): 24       Bite block used: None    Post intubation assessment        Placement verified by: capnometry and equal breath sounds        Number of attempts at approach: 1       Number of other approaches attempted: 0       Secured with: tape       Ease of procedure: easy       Dentition: Intact and Unchanged    Medication(s) Administered   Medication Administration Time: 6/8/2025 9:33 PM

## 2025-06-09 NOTE — OP NOTE
PRE-OP DIAGNOSIS: Post circumcision hematoma with bleeding  POST-OP DIAGNOSIS: Same due to frenular arterial bleed  PROCEDURE: Penile exploration, evacuation of hematoma and redo repair  ANESTHESIA: General    SURGEON: Caleb Breaux MD  ASSISTANT: Staff  Blood loss: 50 mL  Findings: Significant size hematoma approximately 50 cc, frenular arterial bleed    INDICATIONS: Modesto Rodriguez is a 25 year old male with a history of  phimosis and undergone circumcision with Dr. Bach on 6/5/2025.  He presented with secondary hemorrhage in the evening today and was taken up for the exploration after appropriate discussion and consenting    DESCRIPTION OF PROCEDURE: After obtaining informed consent, correctly   identifying and marking the patient and confirming preoperative antibiotics, he was brought back to the operating room table, placed supine under anesthesia care was induced. The patient was then prepped and draped from the umbilicus down to the scrotum.   The penis was significantly engorged and distended and bluish appearing due to the underlying clot.  I then used a #15 blade to remove all the preplaced sutures and then evacuate the hematoma which is approximately 50 cc.  I then subsequently identified the bleeding frenular artery and placed a figure of 8 stitch to control the bleed.  Following this I carefully inspected the entire extent of the incision to ensure adequate hemostasis.  There was smaller losers which were then taken controlled with monopolar cautery.  After ensuring adequate hemostasis I then washed the incision carefully.  . Next, we used interrupted 3-0 Chromic in 4 quadrants and then  between the secured quadrants to reapproximate the skin edges. Next, we then performed a dorsal penile block with 25 mL of 0.25% Marcaine without epinephrine as well as a ring block around the penis. The incision was then washed and dressed with Xeroform and gauze and bacitracin was applied.  Coban was used for  additional dressing.  The patient was awakened from anesthesia in stable condition. There were no complications.     PLAN: The patient needs to follow-up with Dr. Bach on the next available day for inspection of the wound.  He needs to have the dressing removed on 6/10/2025.  He can remove the dressing early if it is uncomfortable and bothersome  Caleb Breaux MD

## 2025-06-10 ENCOUNTER — RESULTS FOLLOW-UP (OUTPATIENT)
Dept: SURGERY | Facility: CLINIC | Age: 25
End: 2025-06-10

## 2025-06-10 ENCOUNTER — OFFICE VISIT (OUTPATIENT)
Dept: UROLOGY | Facility: CLINIC | Age: 25
End: 2025-06-10
Payer: COMMERCIAL

## 2025-06-10 VITALS
BODY MASS INDEX: 26.43 KG/M2 | HEART RATE: 80 BPM | WEIGHT: 140 LBS | HEIGHT: 61 IN | SYSTOLIC BLOOD PRESSURE: 139 MMHG | DIASTOLIC BLOOD PRESSURE: 95 MMHG | OXYGEN SATURATION: 97 % | TEMPERATURE: 97.1 F

## 2025-06-10 DIAGNOSIS — Z41.2 ENCOUNTER FOR CIRCUMCISION: ICD-10-CM

## 2025-06-10 DIAGNOSIS — N47.1 PHIMOSIS: Primary | ICD-10-CM

## 2025-06-10 ASSESSMENT — ACTIVITIES OF DAILY LIVING (ADL)
ADLS_ACUITY_SCORE: 41

## 2025-06-10 ASSESSMENT — PAIN SCALES - GENERAL: PAINLEVEL_OUTOF10: MILD PAIN (2)

## 2025-06-11 ASSESSMENT — ACTIVITIES OF DAILY LIVING (ADL)
ADLS_ACUITY_SCORE: 41

## 2025-06-12 ASSESSMENT — ACTIVITIES OF DAILY LIVING (ADL)
ADLS_ACUITY_SCORE: 41

## 2025-06-13 ASSESSMENT — ACTIVITIES OF DAILY LIVING (ADL)
ADLS_ACUITY_SCORE: 41

## 2025-06-14 ASSESSMENT — ACTIVITIES OF DAILY LIVING (ADL)
ADLS_ACUITY_SCORE: 41

## 2025-06-15 ASSESSMENT — ACTIVITIES OF DAILY LIVING (ADL)
ADLS_ACUITY_SCORE: 41

## 2025-06-16 ASSESSMENT — ACTIVITIES OF DAILY LIVING (ADL)
ADLS_ACUITY_SCORE: 41

## 2025-06-16 NOTE — PROGRESS NOTES
UROLOGY CLINIC NEW VISIT    Chief Complaint:  Phimosis      Referring Provider:  Chad Najera    Subjective:     Modesto Rodriguez is a 25 year old male  with a PMHx significant for those items listed, who is presenting to clinic today to discuss phimosis.    Patient is unable to fully retract foreskin     Ramirez has no history of skin and local infections.   Has not had to take antibiotics/antifungals in the past     He is able to urinate, does not have ballooning. No hematuria     Otherwise healthy no history of DM, wound issues, blood thinners PCP notes reviewed.    6/10/2025  Was doing after surgery until POD 3 when voiding noted possible stitch popping, started to have bleeding which required a clot evac and exploration 6/8 with Dr. Breaux   Still has bandage on. Pain is around a 1-2 he is otherwise doing well able to void, otherwise recovering well from exploration    Path: BENIGN FORESKIN WITH DERMAL FIBROSIS     6/17/25  Doing better, pain resolved. Still residual swelling though no persistent bleeding.     Currently the patient has no fever, chills, nausea, vomiting or other signs/symptoms suggestive of acute systemic infection.    PMH  No past medical history on file.  PSH  Past Surgical History:   Procedure Laterality Date    APPENDECTOMY      CIRCUMCISION N/A 06/05/2025    Procedure: Circumcision with dorsal slit;  Surgeon: Sergio Bach MD;  Location: [facility] OR    EXPLORE SCROTUM N/A 6/8/2025    Procedure: EXPLORATION, Penile with;  Surgeon: Caleb Breaux MD;  Location: [facility] OR    INCISION AND DRAINAGE ABSCESS SCROTUM, COMBINED  6/8/2025    Procedure: release of hematoma WITH REDO REPAIR;  Surgeon: Caleb Breaux MD;  Location: [facility] OR     FAMHx  No family history on file.  ALLERGY  No Known Allergies  MEDICATIONS  has a current medication list which includes the following prescription(s): acetaminophen, diazepam, methocarbamol, ondansetron, and oxycodone, and the  "following Facility-Administered Medications: dexamethasone, naloxone, ondansetron **OR** ondansetron, oxycodone, oxycodone, prochlorperazine.  ROS:  Constitutional: negative  Eyes: negative  Ears/Nose/Throat/Mouth: negative  Respiratory: negative  Cardiovascular: negative  Gastrointestinal: negative  Genito-Urinary: as documented above in HPI  Musculoskeletal: negative  Neurological: negative  Integumentary: negative      Objective:     There were no vitals taken for this visit.   Physical Exam:  GENERAL: Patient is AOx3, in no acute distress  CARDIAC: regular rate  LUNG: breathing non labored  ABD: soft, nondistended  : Incision CDI. There is residual swelling and edema, bruising improved   No underlying hematoma or TTP     LABORATORY:  No results found for: \"CREATININE\"  No results found for: \"PSA\"      Assessment:     Modesto Rodriguez is a 25 year old male  with a PMHx significant for those items listed, who is presenting to clinic today to discuss phimosis.     Plan:      Phimosis  Encounter for circumcision  S/p exploration 6/8 and re-anastomosis    - Overall much improved healing well. Taught very light wrap for edema otherwise plan for follow-up in 1-3 weeks depending on patient preference  - Not cleared for sexual activities    "

## 2025-06-17 ENCOUNTER — OFFICE VISIT (OUTPATIENT)
Dept: UROLOGY | Facility: CLINIC | Age: 25
End: 2025-06-17
Payer: COMMERCIAL

## 2025-06-17 DIAGNOSIS — N47.1 PHIMOSIS: Primary | ICD-10-CM

## 2025-06-17 DIAGNOSIS — Z41.2 ENCOUNTER FOR CIRCUMCISION: ICD-10-CM

## 2025-06-17 PROCEDURE — 99024 POSTOP FOLLOW-UP VISIT: CPT | Performed by: STUDENT IN AN ORGANIZED HEALTH CARE EDUCATION/TRAINING PROGRAM

## 2025-06-17 ASSESSMENT — ACTIVITIES OF DAILY LIVING (ADL)
ADLS_ACUITY_SCORE: 41

## 2025-06-18 ASSESSMENT — ACTIVITIES OF DAILY LIVING (ADL)
ADLS_ACUITY_SCORE: 41

## 2025-06-19 ASSESSMENT — ACTIVITIES OF DAILY LIVING (ADL)
ADLS_ACUITY_SCORE: 41

## 2025-07-29 ENCOUNTER — TELEPHONE (OUTPATIENT)
Dept: UROLOGY | Facility: CLINIC | Age: 25
End: 2025-07-29
Payer: COMMERCIAL

## 2025-08-07 ENCOUNTER — TELEPHONE (OUTPATIENT)
Dept: UROLOGY | Facility: CLINIC | Age: 25
End: 2025-08-07
Payer: COMMERCIAL

## 2025-09-02 ENCOUNTER — TELEPHONE (OUTPATIENT)
Dept: UROLOGY | Facility: CLINIC | Age: 25
End: 2025-09-02
Payer: COMMERCIAL

## (undated) DEVICE — NDL 25GA 1.5" 305127

## (undated) DEVICE — TRAY CATH SURESTEP OD14 FR INTERMITTENT STER LTX INTS14

## (undated) DEVICE — TRAY PREP DRY SKIN SCRUB 067

## (undated) DEVICE — SYRINGE IRRIGATION BULB TIP 60ML STER LF DYND20125

## (undated) DEVICE — BLADE KNIFE SURG 15 371115

## (undated) DEVICE — BNDG KLING 2" 2231

## (undated) DEVICE — SYR BULB IRRIG DOVER 60 ML LATEX FREE 67000

## (undated) DEVICE — TUBING SUCTION MEDI-VAC 1/4"X20' N620A

## (undated) DEVICE — DRESSING XEROFORM PETROLATUM 5X9 33605

## (undated) DEVICE — SU CHROMIC 3-0 SH 27" G122H

## (undated) DEVICE — SYR 10ML LL W/O NDL 302995

## (undated) RX ORDER — FENTANYL CITRATE 50 UG/ML
INJECTION, SOLUTION INTRAMUSCULAR; INTRAVENOUS
Status: DISPENSED
Start: 2025-06-08

## (undated) RX ORDER — LIDOCAINE HYDROCHLORIDE 10 MG/ML
INJECTION, SOLUTION EPIDURAL; INFILTRATION; INTRACAUDAL; PERINEURAL
Status: DISPENSED
Start: 2025-06-08

## (undated) RX ORDER — GINSENG 100 MG
CAPSULE ORAL
Status: DISPENSED
Start: 2025-06-08

## (undated) RX ORDER — ONDANSETRON 2 MG/ML
INJECTION INTRAMUSCULAR; INTRAVENOUS
Status: DISPENSED
Start: 2025-06-08

## (undated) RX ORDER — DEXAMETHASONE SODIUM PHOSPHATE 10 MG/ML
INJECTION, SOLUTION INTRAMUSCULAR; INTRAVENOUS
Status: DISPENSED
Start: 2025-06-08

## (undated) RX ORDER — BUPIVACAINE HYDROCHLORIDE 2.5 MG/ML
INJECTION, SOLUTION EPIDURAL; INFILTRATION; INTRACAUDAL; PERINEURAL
Status: DISPENSED
Start: 2025-06-08

## (undated) RX ORDER — NALOXONE HYDROCHLORIDE 0.4 MG/ML
INJECTION, SOLUTION INTRAMUSCULAR; INTRAVENOUS; SUBCUTANEOUS
Status: DISPENSED
Start: 2025-06-08

## (undated) RX ORDER — PROPOFOL 10 MG/ML
INJECTION, EMULSION INTRAVENOUS
Status: DISPENSED
Start: 2025-06-08